# Patient Record
(demographics unavailable — no encounter records)

---

## 2025-06-26 NOTE — PAST MEDICAL HISTORY
[Perimenopausal] : The patient is perimenopausal [Menarche Age ____] : age at menarche was [unfilled] [History of Hormone Replacement Treatment] : has a history of hormone replacement treatment [Definite ___ (Date)] : the last menstrual period was [unfilled] [Total Preg ___] : G[unfilled] [Live Births ___] : P[unfilled]  [Age At Live Birth ___] : Age at live birth: [unfilled] [FreeTextEntry6] : None [FreeTextEntry7] : OCP use x few years as a teenager [FreeTextEntry8] : Pregnancy 1 x 15 months\par  Pregnancy 2 x 17.5 months

## 2025-06-26 NOTE — HISTORY OF PRESENT ILLNESS
[FreeTextEntry1] : Patient is a 57yo F who presents today for breast cancer screening. Denies family history of breast or ovarian cancer. Patient denies palpable masses, skin changes, or nipple discharge bilaterally.  CINDY Lifetime Risk- 12%2025  10/13/17: B/l MG- area of asymmetry on right 10/26/17: R MG & US- 0.5 cm simple cyst 11/5/18: B/l MG & US- multiple bilateral cysts 11/20/19: B/l MG & US- no evid of disease 3/24/21: B/l MG & US- B/l benign appearing calcs. B/l benign appearing cysts. BIRADS 2.  3/31/22: B/l MG & US- scattered fibroglandular. B/l stable benign calcs, right stable mass, left [id number] 2 4/5/23: B/l MG & US- scattered fibroglandular. US- R 0.5cm stable nodule 11:00 2FN. BI-RADS 2 5/29/24: B/l screening MG/ unilateral diagnostic mammogram- scattered areas of fibroglandular density, new calcifications are present in the left upper central breast. Additional evaluation recommended. L diag- calcifications in the left upper central breast to be predominantly punctate, potentially surround a central lucency. No suspicious branching or linear features are present. BIRADS 3 11/25/2024: L DX MG: Scattered fibroglandular. L stable 12:00 tiny subcentimeter cluster of calcs (rec routine bilateral screening with magnification views at time of annual). BI-RADS 3 6/26/25 B/L MG-scattered fibroglandular.  L scattered round calcifications, similar appearance since previous exam and demonstrate layering, typical for benign entity.  BI-RADS 2

## 2025-06-26 NOTE — PHYSICAL EXAM
[Normocephalic] : normocephalic [EOMI] : extra ocular movement intact [Supple] : supple [No Supraclavicular Adenopathy] : no supraclavicular adenopathy [No Cervical Adenopathy] : no cervical adenopathy [de-identified] :  Patient was seen in upright and supine position. Within the right breast, no discrete palpable masses, no suspicious overlying skin changes, no nipple discharge appreciated. No gross palpable adenopathy. Within the left breast, no discrete palpable masses, no suspicious overlying skin changes, no nipple discharge appreciated. No gross palpable adenopathy. B/L UE ROM >90 degrees

## 2025-06-26 NOTE — ASSESSMENT
[FreeTextEntry1] : Reviewed today's breast imaging with the patient in great detail.  Offered patient a physical copy of today's breast imaging, however patient declined.  Patient verbalized understanding.  Reviewed clinical exam findings the patient in great detail.  Patient verbalized understanding.  Patient reports routine follow-up with PCP, GYN, and dermatology.

## 2025-06-26 NOTE — HISTORY OF PRESENT ILLNESS
[FreeTextEntry1] : Patient is a 55yo F who presents today for breast cancer screening. Denies family history of breast or ovarian cancer. Patient denies palpable masses, skin changes, or nipple discharge bilaterally.  CINDY Lifetime Risk- 12%2025  10/13/17: B/l MG- area of asymmetry on right 10/26/17: R MG & US- 0.5 cm simple cyst 11/5/18: B/l MG & US- multiple bilateral cysts 11/20/19: B/l MG & US- no evid of disease 3/24/21: B/l MG & US- B/l benign appearing calcs. B/l benign appearing cysts. BIRADS 2.  3/31/22: B/l MG & US- scattered fibroglandular. B/l stable benign calcs, right stable mass, left [id number] 2 4/5/23: B/l MG & US- scattered fibroglandular. US- R 0.5cm stable nodule 11:00 2FN. BI-RADS 2 5/29/24: B/l screening MG/ unilateral diagnostic mammogram- scattered areas of fibroglandular density, new calcifications are present in the left upper central breast. Additional evaluation recommended. L diag- calcifications in the left upper central breast to be predominantly punctate, potentially surround a central lucency. No suspicious branching or linear features are present. BIRADS 3 11/25/2024: L DX MG: Scattered fibroglandular. L stable 12:00 tiny subcentimeter cluster of calcs (rec routine bilateral screening with magnification views at time of annual). BI-RADS 3 6/26/25 B/L MG-scattered fibroglandular.  L scattered round calcifications, similar appearance since previous exam and demonstrate layering, typical for benign entity.  BI-RADS 2

## 2025-06-26 NOTE — PHYSICAL EXAM
[Normocephalic] : normocephalic [EOMI] : extra ocular movement intact [Supple] : supple [No Supraclavicular Adenopathy] : no supraclavicular adenopathy [No Cervical Adenopathy] : no cervical adenopathy [de-identified] :  Patient was seen in upright and supine position. Within the right breast, no discrete palpable masses, no suspicious overlying skin changes, no nipple discharge appreciated. No gross palpable adenopathy. Within the left breast, no discrete palpable masses, no suspicious overlying skin changes, no nipple discharge appreciated. No gross palpable adenopathy. B/L UE ROM >90 degrees